# Patient Record
Sex: FEMALE | Race: WHITE | Employment: FULL TIME | URBAN - METROPOLITAN AREA
[De-identification: names, ages, dates, MRNs, and addresses within clinical notes are randomized per-mention and may not be internally consistent; named-entity substitution may affect disease eponyms.]

---

## 2022-11-27 ENCOUNTER — APPOINTMENT (OUTPATIENT)
Dept: GENERAL RADIOLOGY | Age: 42
End: 2022-11-27

## 2022-11-27 ENCOUNTER — HOSPITAL ENCOUNTER (EMERGENCY)
Age: 42
Discharge: HOME | End: 2022-11-27
Attending: EMERGENCY MEDICINE

## 2022-11-27 VITALS
TEMPERATURE: 98.4 F | SYSTOLIC BLOOD PRESSURE: 110 MMHG | BODY MASS INDEX: 24.27 KG/M2 | DIASTOLIC BLOOD PRESSURE: 80 MMHG | WEIGHT: 137 LBS | OXYGEN SATURATION: 95 % | HEIGHT: 63 IN | HEART RATE: 82 BPM | RESPIRATION RATE: 16 BRPM

## 2022-11-27 DIAGNOSIS — J20.9 ACUTE BRONCHITIS, UNSPECIFIED ORGANISM: ICD-10-CM

## 2022-11-27 DIAGNOSIS — R68.89 FLU-LIKE SYMPTOMS: Primary | ICD-10-CM

## 2022-11-27 LAB
FLUAV AG NPH QL IA: NEGATIVE
FLUBV AG NPH QL IA: NEGATIVE
SARS-COV-2 RDRP RESP QL NAA+PROBE: NOT DETECTED
SOURCE: NORMAL
SPECIMEN SOURCE: NORMAL

## 2022-11-27 PROCEDURE — 6360000002 HC RX W HCPCS: Performed by: EMERGENCY MEDICINE

## 2022-11-27 PROCEDURE — 96372 THER/PROPH/DIAG INJ SC/IM: CPT | Performed by: EMERGENCY MEDICINE

## 2022-11-27 PROCEDURE — 87635 SARS-COV-2 COVID-19 AMP PRB: CPT

## 2022-11-27 PROCEDURE — 94640 AIRWAY INHALATION TREATMENT: CPT | Performed by: EMERGENCY MEDICINE

## 2022-11-27 PROCEDURE — 87804 INFLUENZA ASSAY W/OPTIC: CPT

## 2022-11-27 PROCEDURE — 71046 X-RAY EXAM CHEST 2 VIEWS: CPT

## 2022-11-27 PROCEDURE — 99284 EMERGENCY DEPT VISIT MOD MDM: CPT | Performed by: EMERGENCY MEDICINE

## 2022-11-27 PROCEDURE — 6370000000 HC RX 637 (ALT 250 FOR IP): Performed by: EMERGENCY MEDICINE

## 2022-11-27 RX ORDER — DOXYCYCLINE HYCLATE 100 MG
100 TABLET ORAL 2 TIMES DAILY
Qty: 14 TABLET | Refills: 0 | Status: SHIPPED | OUTPATIENT
Start: 2022-11-27 | End: 2022-12-04

## 2022-11-27 RX ORDER — KETOROLAC TROMETHAMINE 30 MG/ML
60 INJECTION, SOLUTION INTRAMUSCULAR; INTRAVENOUS
Status: COMPLETED | OUTPATIENT
Start: 2022-11-27 | End: 2022-11-27

## 2022-11-27 RX ORDER — BENZONATATE 200 MG/1
200 CAPSULE ORAL 3 TIMES DAILY PRN
Qty: 21 CAPSULE | Refills: 0 | Status: SHIPPED | OUTPATIENT
Start: 2022-11-27

## 2022-11-27 RX ORDER — BENZONATATE 100 MG/1
200 CAPSULE ORAL
Status: COMPLETED | OUTPATIENT
Start: 2022-11-27 | End: 2022-11-27

## 2022-11-27 RX ORDER — IBUPROFEN 800 MG/1
800 TABLET ORAL EVERY 8 HOURS PRN
Qty: 21 TABLET | Refills: 0 | Status: SHIPPED | OUTPATIENT
Start: 2022-11-27

## 2022-11-27 RX ORDER — IPRATROPIUM BROMIDE AND ALBUTEROL SULFATE 2.5; .5 MG/3ML; MG/3ML
1 SOLUTION RESPIRATORY (INHALATION)
Status: COMPLETED | OUTPATIENT
Start: 2022-11-27 | End: 2022-11-27

## 2022-11-27 RX ORDER — PREDNISONE 20 MG/1
40 TABLET ORAL DAILY
Qty: 8 TABLET | Refills: 0 | Status: SHIPPED | OUTPATIENT
Start: 2022-11-27 | End: 2022-12-01

## 2022-11-27 RX ORDER — ALBUTEROL SULFATE 90 UG/1
2 AEROSOL, METERED RESPIRATORY (INHALATION) EVERY 6 HOURS PRN
Qty: 18 G | Refills: 3 | Status: SHIPPED | OUTPATIENT
Start: 2022-11-27

## 2022-11-27 RX ORDER — DOXYCYCLINE HYCLATE 100 MG/1
100 CAPSULE ORAL
Status: COMPLETED | OUTPATIENT
Start: 2022-11-27 | End: 2022-11-27

## 2022-11-27 RX ADMIN — KETOROLAC TROMETHAMINE 60 MG: 30 INJECTION, SOLUTION INTRAMUSCULAR at 03:09

## 2022-11-27 RX ADMIN — BENZONATATE 200 MG: 100 CAPSULE ORAL at 03:09

## 2022-11-27 RX ADMIN — DOXYCYCLINE HYCLATE 100 MG: 100 CAPSULE ORAL at 03:09

## 2022-11-27 RX ADMIN — IPRATROPIUM BROMIDE AND ALBUTEROL SULFATE 1 AMPULE: .5; 3 SOLUTION RESPIRATORY (INHALATION) at 03:11

## 2022-11-27 ASSESSMENT — PAIN SCALES - GENERAL
PAINLEVEL_OUTOF10: 8
PAINLEVEL_OUTOF10: 6
PAINLEVEL_OUTOF10: 8

## 2022-11-27 ASSESSMENT — ENCOUNTER SYMPTOMS
RHINORRHEA: 0
COUGH: 1
EYE DISCHARGE: 0
FACIAL SWELLING: 0
BACK PAIN: 1
NAUSEA: 1
ABDOMINAL PAIN: 0
EYE REDNESS: 0
SHORTNESS OF BREATH: 0
COLOR CHANGE: 0
VOMITING: 0

## 2022-11-27 ASSESSMENT — LIFESTYLE VARIABLES: HOW OFTEN DO YOU HAVE A DRINK CONTAINING ALCOHOL: MONTHLY OR LESS

## 2022-11-27 ASSESSMENT — PAIN - FUNCTIONAL ASSESSMENT: PAIN_FUNCTIONAL_ASSESSMENT: 0-10

## 2022-11-27 ASSESSMENT — PAIN DESCRIPTION - DESCRIPTORS: DESCRIPTORS: ACHING

## 2022-11-27 ASSESSMENT — PAIN DESCRIPTION - LOCATION
LOCATION: GENERALIZED
LOCATION: GENERALIZED

## 2022-11-27 NOTE — ED NOTES
Pt c/o generalized body aches, cough and fever for several days.   Audible expiratory wheezes auscultated     Delia Amezquita RN  11/27/22 3327

## 2022-11-27 NOTE — DISCHARGE INSTRUCTIONS
Alternate 4 ibuprofen every 8 hours with 2 extra-strength tylenol every 6 hours  Use inhlaer 2 puffs every 6 hours  1,200mg mucinex DM every 12 hours for a week.   Try a sustained release sudafed every morning,  Drink plenty of fluids    Stay off the cigarretes

## 2022-11-27 NOTE — ED NOTES
I have reviewed discharge instructions with the patient. The patient verbalized understanding. Patient left ED via Discharge Method: ambulatory to Home with ( family self). Opportunity for questions and clarification provided. Patient given 4 scripts. To continue your aftercare when you leave the hospital, you may receive an automated call from our care team to check in on how you are doing. This is a free service and part of our promise to provide the best care and service to meet your aftercare needs.  If you have questions, or wish to unsubscribe from this service please call [phone]. Thank you for Choosing our [facility] Emergency Department.        Evelyn Tinajero RN  11/27/22 4903

## 2022-11-27 NOTE — ED PROVIDER NOTES
[employer] Emergency Department Provider Note                   PCP:                None Provider               Age: 40 y.o. Sex: female       ICD-10-CM    1. Flu-like symptoms  R68.89       2. Acute bronchitis, unspecified organism  J20.9           DISPOSITION         New Prescriptions    No medications on file       Orders Placed This Encounter   Procedures    COVID-19, Rapid    Rapid influenza A/B antigens    XR CHEST (2 VW)    [facility] nursing order (specify)         Anastasia Johnson is a 40 y.o. female who presents to the Emergency Department with chief complaint of    Chief Complaint   Patient presents with    Cough    Fever      Chief complaint : Flulike illness with cough and fever    HISTORY OF PRESENT ILLNESS :  Location : Generalized    Quality : Cough is somewhat productive    Quantity : Constant    Timing : Few days    Severity : Getting worse    Context : Coughing so bad she cannot even smoke cigarettes now    Alleviating / exacerbating factors : No remedies attempted    Associated Symptoms : Chest hurts when coughing    -------------------------------    SOCIAL HISTORY : , smoker, consumes alcohol        Review of Systems   Constitutional:  Positive for activity change, fatigue and fever. Negative for chills. HENT:  Positive for congestion. Negative for facial swelling and rhinorrhea. Eyes:  Negative for discharge and redness. Respiratory:  Positive for cough. Negative for shortness of breath. Cardiovascular:  Positive for chest pain. Negative for palpitations. Gastrointestinal:  Positive for nausea. Negative for abdominal pain and vomiting. Endocrine: Negative for polydipsia and polyuria. Genitourinary:  Negative for difficulty urinating and dysuria. Musculoskeletal:  Positive for arthralgias, back pain and myalgias. Skin:  Negative for color change and pallor. Neurological:  Negative for dizziness and headaches. All other systems reviewed and are negative.    All other place, and time. Psychiatric:         Mood and Affect: Mood normal.         Behavior: Behavior normal.         Thought Content: Thought content normal.        MDM  Number of Diagnoses or Management Options  Acute bronchitis, unspecified organism: new, no workup  Flu-like symptoms: new, needed workup  Diagnosis management comments: Flulike syndrome with acute bronchitis and significant wheezing, will start prednisone, antibiotics, Tessalon, albuterol here and to go       Amount and/or Complexity of Data Reviewed  Clinical lab tests: reviewed and ordered  Tests in the radiology section of CPT®: reviewed and ordered  Decide to obtain previous medical records or to obtain history from someone other than the patient: yes    Risk of Complications, Morbidity, and/or Mortality  Presenting problems: moderate  Diagnostic procedures: low  Management options: low    Patient Progress  Patient progress: improved      Procedures    Labs Reviewed   COVID-19, RAPID   RAPID INFLUENZA A/B ANTIGENS        XR CHEST (2 VW)   Final Result      1. Negative chest x-ray. [place] Coma Scale  Eye Opening: Spontaneous  Best Verbal Response: Oriented  Best Motor Response: Obeys commands  Tyree Coma Scale Score: 15                     Voice dictation software was used during the making of this note. This software is not perfect and grammatical and other typographical errors may be present. This note has not been completely proofread for errors.        Wesley Zaidi MD  11/27/22 6312

## 2022-12-05 ENCOUNTER — APPOINTMENT (OUTPATIENT)
Dept: GENERAL RADIOLOGY | Age: 42
End: 2022-12-05
Payer: COMMERCIAL

## 2022-12-05 ENCOUNTER — HOSPITAL ENCOUNTER (EMERGENCY)
Age: 42
Discharge: HOME | End: 2022-12-05
Attending: EMERGENCY MEDICINE | Admitting: EMERGENCY MEDICINE
Payer: COMMERCIAL

## 2022-12-05 VITALS
RESPIRATION RATE: 18 BRPM | BODY MASS INDEX: 24.27 KG/M2 | HEIGHT: 63 IN | HEART RATE: 88 BPM | WEIGHT: 137 LBS | SYSTOLIC BLOOD PRESSURE: 145 MMHG | OXYGEN SATURATION: 100 % | DIASTOLIC BLOOD PRESSURE: 92 MMHG | TEMPERATURE: 98.9 F
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
## 2022-12-05 PROCEDURE — 90471 IMMUNIZATION ADMIN: CPT | Performed by: EMERGENCY MEDICINE

## 2022-12-05 PROCEDURE — 6360000002 HC RX W HCPCS: Performed by: EMERGENCY MEDICINE

## 2022-12-05 PROCEDURE — 73120 X-RAY EXAM OF HAND: CPT

## 2022-12-05 PROCEDURE — 99284 EMERGENCY DEPT VISIT MOD MDM: CPT | Performed by: EMERGENCY MEDICINE

## 2022-12-05 PROCEDURE — 73130 X-RAY EXAM OF HAND: CPT

## 2022-12-05 PROCEDURE — 6370000000 HC RX 637 (ALT 250 FOR IP): Performed by: EMERGENCY MEDICINE

## 2022-12-05 PROCEDURE — 90715 TDAP VACCINE 7 YRS/> IM: CPT | Performed by: EMERGENCY MEDICINE

## 2022-12-05 RX ORDER — HYDROCODONE BITARTRATE AND ACETAMINOPHEN 5; 325 MG/1; MG/1
1 TABLET ORAL EVERY 6 HOURS PRN
Qty: 12 TABLET | Refills: 0 | Status: SHIPPED | OUTPATIENT
Start: 2022-12-05 | End: 2022-12-08

## 2022-12-05 RX ORDER — AMOXICILLIN AND CLAVULANATE POTASSIUM 875; 125 MG/1; MG/1
1 TABLET, FILM COATED ORAL 2 TIMES DAILY
Qty: 20 TABLET | Refills: 0 | Status: SHIPPED | OUTPATIENT
Start: 2022-12-05 | End: 2022-12-15

## 2022-12-05 RX ORDER — IBUPROFEN 600 MG/1
600 TABLET ORAL
Status: COMPLETED | OUTPATIENT
Start: 2022-12-05 | End: 2022-12-05

## 2022-12-05 RX ORDER — AMOXICILLIN AND CLAVULANATE POTASSIUM 875; 125 MG/1; MG/1
1 TABLET, FILM COATED ORAL
Status: COMPLETED | OUTPATIENT
Start: 2022-12-05 | End: 2022-12-05

## 2022-12-05 RX ADMIN — IBUPROFEN 600 MG: 600 TABLET, FILM COATED ORAL at 05:15

## 2022-12-05 RX ADMIN — AMOXICILLIN AND CLAVULANATE POTASSIUM 1 TABLET: 875; 125 TABLET, FILM COATED ORAL at 05:15

## 2022-12-05 RX ADMIN — TETANUS TOXOID, REDUCED DIPHTHERIA TOXOID AND ACELLULAR PERTUSSIS VACCINE, ADSORBED 0.5 ML: 5; 2.5; 8; 8; 2.5 SUSPENSION INTRAMUSCULAR at 05:17

## 2022-12-05 ASSESSMENT — PAIN DESCRIPTION - LOCATION: LOCATION: HAND

## 2022-12-05 ASSESSMENT — PAIN DESCRIPTION - ORIENTATION: ORIENTATION: LEFT

## 2022-12-05 ASSESSMENT — ENCOUNTER SYMPTOMS
GASTROINTESTINAL NEGATIVE: 1
EYES NEGATIVE: 1
RESPIRATORY NEGATIVE: 1

## 2022-12-05 ASSESSMENT — PAIN SCALES - GENERAL: PAINLEVEL_OUTOF10: 9

## 2022-12-05 ASSESSMENT — PAIN - FUNCTIONAL ASSESSMENT: PAIN_FUNCTIONAL_ASSESSMENT: 0-10

## 2022-12-05 NOTE — Clinical Note
Jackie Waddell was seen and treated in our emergency department on 12/5/2022. She may return to work on 12/07/2022. If you have any questions or concerns, please don't hesitate to call.       Alejandra Davis MD

## 2022-12-05 NOTE — ED PROVIDER NOTES
Emergency Department Provider Note                   PCP:                None Provider               Age: 40 y.o. Sex: female       ICD-10-CM    1. Dog bite, initial encounter  W54. 0XXA HYDROcodone-acetaminophen (Fleeta Mast) 5-325 MG per tablet          DISPOSITION Decision To Discharge 12/05/2022 05:09:39 AM        MDM  Number of Diagnoses or Management Options  Dog bite, initial encounter  Diagnosis management comments: Patient was with her mother when they were both bitten by their own dog during a dog attack. Patient received Tdap along with Augmentin. She was copiously irrigated for 10 minutes. Did not receive sutures due to the nature of it being a dog bite. Plain films were obtained of both hands did not show any acute fracture dislocation or foreign body. She will receive prescription for Augmentin and pain medication. Discussed extensively reasons to return regarding infection. Patient's dog was up to date on shots and did not require rabies. Orders Placed This Encounter   Procedures    XR HAND LEFT (MIN 3 VIEWS)    XR HAND RIGHT (2 VIEWS)        Medications   Tetanus-Diphth-Acell Pertussis (BOOSTRIX) injection 0.5 mL (has no administration in time range)   amoxicillin-clavulanate (AUGMENTIN) 875-125 MG per tablet 1 tablet (has no administration in time range)   ibuprofen (ADVIL;MOTRIN) tablet 600 mg (has no administration in time range)       New Prescriptions    AMOXICILLIN-CLAVULANATE (AUGMENTIN) 875-125 MG PER TABLET    Take 1 tablet by mouth 2 times daily for 10 days    HYDROCODONE-ACETAMINOPHEN (NORCO) 5-325 MG PER TABLET    Take 1 tablet by mouth every 6 hours as needed for Pain for up to 3 days. Intended supply: 3 days.  Take lowest dose possible to manage pain        Beau Phan is a 40 y.o. female who presents to the Emergency Department with chief complaint of    Chief Complaint   Patient presents with    Animal Bite      20-year-old female presenting with dog bite to both hands.  Her dog was attacked by another dog and in the confusion she was bitten by her own dog. Her dog is up-to-date. Her mother is here with her who is up also bitten by her own dog. Patient's primary complaint is of both hands and fingers. No other bite wounds or injuries. Review of Systems   HENT: Negative. Eyes: Negative. Respiratory: Negative. Cardiovascular: Negative. Gastrointestinal: Negative. Genitourinary: Negative. Musculoskeletal: Negative. Skin:  Positive for wound. Neurological: Negative. Psychiatric/Behavioral: Negative. Past Medical History:   Diagnosis Date    Patent ductus arteriosus         Past Surgical History:   Procedure Laterality Date    CARDIAC SURGERY      CHOLECYSTECTOMY      JOINT REPLACEMENT          No family history on file. Social History     Socioeconomic History    Marital status:    Tobacco Use    Smoking status: Every Day     Types: Cigarettes   Substance and Sexual Activity    Alcohol use: Yes     Comment: occassionally    Drug use: Not Currently         Aspirin and Hydrocodone     Previous Medications    ALBUTEROL SULFATE HFA (PROVENTIL HFA) 108 (90 BASE) MCG/ACT INHALER    Inhale 2 puffs into the lungs every 6 hours as needed for Wheezing    BENZONATATE (TESSALON) 200 MG CAPSULE    Take 1 capsule by mouth 3 times daily as needed for Cough    IBUPROFEN (IBU) 800 MG TABLET    Take 1 tablet by mouth every 8 hours as needed for Pain        Vitals signs and nursing note reviewed. Patient Vitals for the past 4 hrs:   Temp Pulse Resp BP SpO2   12/05/22 0445 98.8 °F (37.1 °C) 97 20 (!) 155/113 100 %          Physical Exam  Constitutional:       General: She is not in acute distress. Appearance: Normal appearance. She is not ill-appearing. HENT:      Head: Normocephalic and atraumatic. Nose: No rhinorrhea.       Mouth/Throat:      Mouth: Mucous membranes are moist.   Eyes:      Extraocular Movements: Extraocular movements intact. Cardiovascular:      Rate and Rhythm: Normal rate and regular rhythm. Pulmonary:      Effort: Pulmonary effort is normal.      Breath sounds: Normal breath sounds. Abdominal:      General: Abdomen is flat. Bowel sounds are normal. There is no distension. Palpations: Abdomen is soft. Tenderness: There is no abdominal tenderness. Musculoskeletal:         General: Normal range of motion. Cervical back: Normal range of motion. Skin:     General: Skin is warm and dry. Comments: Patient has puncture wounds on the left dorsum of the proximal hand towards the wrist on the first second fourth distal phalanges. All are puncture wounds. There is no open gaping wounds all approximately 1 to 2 mm. No foreign bodies appreciated bleeding is controlled. Neurovascularly intact. On her right hand she has a small puncture wound on her mid thumb with no bleeding foreign body. She is neurovascularly intact. Neurological:      General: No focal deficit present. Mental Status: She is alert. Psychiatric:         Mood and Affect: Mood normal.        Procedures    Results for orders placed or performed during the hospital encounter of 12/05/22   XR HAND LEFT (MIN 3 VIEWS)    Narrative    EXAMINATION: Right hand    HISTORY: Dog bite to dorsal palmar aspect hand. TECHNIQUE: Frontal, lateral, and oblique views of the right hand. COMPARISON: None available. FINDINGS:   There is no evidence of acute fracture or dislocation. Joint spaces are maintained. Soft tissues are within normal limits. No subcutaneous gas appreciated. No radiopaque foreign body. Impression    No evidence of acute fracture or dislocation within the right hand. EXAMINATION: Left hand    HISTORY: Dog bite to dorsal palmar aspect hand. TECHNIQUE: Frontal, lateral, and oblique views of the left hand. COMPARISON: None available.     FINDINGS:   There is no evidence of acute fracture or dislocation. Joint spaces are maintained. Soft tissues are within normal limits. No subcutaneous gas observed. No radiopaque foreign body. IMPRESSION:  No evidence of acute fracture or dislocation within the left hand. XR HAND RIGHT (2 VIEWS)    Narrative    EXAMINATION: Right hand    HISTORY: Dog bite to dorsal palmar aspect hand. TECHNIQUE: Frontal, lateral, and oblique views of the right hand. COMPARISON: None available. FINDINGS:   There is no evidence of acute fracture or dislocation. Joint spaces are maintained. Soft tissues are within normal limits. No subcutaneous gas appreciated. No radiopaque foreign body. Impression    No evidence of acute fracture or dislocation within the right hand. EXAMINATION: Left hand    HISTORY: Dog bite to dorsal palmar aspect hand. TECHNIQUE: Frontal, lateral, and oblique views of the left hand. COMPARISON: None available. FINDINGS:   There is no evidence of acute fracture or dislocation. Joint spaces are maintained. Soft tissues are within normal limits. No subcutaneous gas observed. No radiopaque foreign body. IMPRESSION:  No evidence of acute fracture or dislocation within the left hand. XR HAND LEFT (MIN 3 VIEWS)   Final Result   No evidence of acute fracture or dislocation within the right hand. EXAMINATION: Left hand      HISTORY: Dog bite to dorsal palmar aspect hand. TECHNIQUE: Frontal, lateral, and oblique views of the left hand. COMPARISON: None available. FINDINGS:    There is no evidence of acute fracture or dislocation. Joint spaces are maintained. Soft tissues are within normal limits. No subcutaneous gas observed. No radiopaque foreign body. IMPRESSION:   No evidence of acute fracture or dislocation within the left hand.             XR HAND RIGHT (2 VIEWS)   Final Result   No evidence of acute fracture or dislocation within the right hand. EXAMINATION: Left hand      HISTORY: Dog bite to dorsal palmar aspect hand. TECHNIQUE: Frontal, lateral, and oblique views of the left hand. COMPARISON: None available. FINDINGS:    There is no evidence of acute fracture or dislocation. Joint spaces are maintained. Soft tissues are within normal limits. No subcutaneous gas observed. No radiopaque foreign body. IMPRESSION:   No evidence of acute fracture or dislocation within the left hand. Voice dictation software was used during the making of this note. This software is not perfect and grammatical and other typographical errors may be present. This note has not been completely proofread for errors.      Tracy Fernandez MD  12/05/22 3356

## 2022-12-05 NOTE — ED NOTES
I have reviewed discharge instructions with the patient. The patient verbalized understanding. Patient left ED via Discharge Method: ambulatory to Home with mom    Opportunity for questions and clarification provided. Patient given 2 scripts. To continue your aftercare when you leave the hospital, you may receive an automated call from our care team to check in on how you are doing. This is a free service and part of our promise to provide the best care and service to meet your aftercare needs.  If you have questions, or wish to unsubscribe from this service please call [phone]. Thank you for Choosing our [facility] Emergency Department.         Sadi Zhou RN  12/05/22 5837

## 2022-12-05 NOTE — ED TRIAGE NOTES
PT ambulatory to ED. Pt reports her mother was taking their dog out when the neighbors dog charged at their dog. Pt went to separate the dogs and got bit by their own dog. Pt reports their dog is up to date on shots. Bleeding is controlled.

## 2024-02-01 ENCOUNTER — HOSPITAL ENCOUNTER (EMERGENCY)
Age: 44
Discharge: HOME | End: 2024-02-01
Attending: EMERGENCY MEDICINE
Payer: COMMERCIAL

## 2024-02-01 ENCOUNTER — APPOINTMENT (OUTPATIENT)
Dept: GENERAL RADIOLOGY | Age: 44
End: 2024-02-01
Payer: COMMERCIAL

## 2024-02-01 VITALS
BODY MASS INDEX: 26.05 KG/M2 | OXYGEN SATURATION: 100 % | TEMPERATURE: 98.2 F | WEIGHT: 147 LBS | HEIGHT: 63 IN | RESPIRATION RATE: 17 BRPM | DIASTOLIC BLOOD PRESSURE: 81 MMHG | SYSTOLIC BLOOD PRESSURE: 117 MMHG | HEART RATE: 70 BPM

## 2024-02-01 DIAGNOSIS — S46.911A STRAIN OF RIGHT ELBOW, INITIAL ENCOUNTER: Primary | ICD-10-CM

## 2024-02-01 PROCEDURE — 73080 X-RAY EXAM OF ELBOW: CPT

## 2024-02-01 PROCEDURE — 99283 EMERGENCY DEPT VISIT LOW MDM: CPT

## 2024-02-01 RX ORDER — NAPROXEN SODIUM 550 MG/1
550 TABLET ORAL 2 TIMES DAILY WITH MEALS
Qty: 30 TABLET | Refills: 0 | Status: SHIPPED | OUTPATIENT
Start: 2024-02-01

## 2024-02-01 ASSESSMENT — ENCOUNTER SYMPTOMS
ABDOMINAL PAIN: 0
BOWEL INCONTINENCE: 0

## 2024-02-01 ASSESSMENT — PAIN - FUNCTIONAL ASSESSMENT: PAIN_FUNCTIONAL_ASSESSMENT: 0-10

## 2024-02-01 NOTE — DISCHARGE INSTRUCTIONS
Take medications as prescribed  Keep arm in sling  Follow-up orthopedics if symptoms persist  Return to the ER for any new, worse or life-threatening symptoms

## 2024-02-01 NOTE — ED PROVIDER NOTES
Emergency Department Provider Note       PCP: Domenica Gaspar APRN - NP   Age: 43 y.o.   Sex: female     DISPOSITION Decision To Discharge 02/01/2024 11:28:49 AM       ICD-10-CM    1. Strain of right elbow, initial encounter  S46.911A           Medical Decision Making     Complexity of Problems Addressed:  Complexity of Problem: 1 acute, uncomplicated illness or injury.    Data Reviewed and Analyzed:  I independently ordered and reviewed each unique test.  I reviewed external records: ED visit note from an outside group.  I reviewed external records: provider visit note from PCP.       I interpreted the X-rays.  No acute fracture noted    Discussion of management or test interpretation.  Some decreased motion and tenderness at the elbow.  No wrist pain.  No shoulder pain.  Will obtain right elbow x-ray.    11:31 AM EST  X-ray shows no acute fracture.  Will place in sling.  Start NSAIDs.  Encourage follow-up with orthopedics if symptoms worsen       Risk of Complications and/or Morbidity of Patient Management:  OTC drug management performed        History      The history is provided by the patient.   Fall  The accident occurred 2 days ago. The fall occurred while walking. The point of impact was the right elbow. The pain is present in the right elbow. The pain is at a severity of 2/10. The pain is moderate. She was Ambulatory at the scene. Pertinent negatives include no abdominal pain, no bowel incontinence, no hematuria and no headaches.   Arm Pain  This is a new problem. The current episode started 2 days ago. The problem occurs constantly. Pertinent negatives include no abdominal pain and no headaches.        Physical Exam     Vitals signs and nursing note reviewed.   Vitals:    02/01/24 0958   BP: 114/73   Pulse: 74   Resp: 18   Temp: 98.2 °F (36.8 °C)   TempSrc: Oral   SpO2: 97%   Weight: 66.7 kg (147 lb)   Height: 1.6 m (5' 3\")       Physical Exam  Vitals and nursing note reviewed.   Constitutional:

## 2024-02-01 NOTE — ED TRIAGE NOTES
Pt reports that she fell down her steps several nights ago and her right arm made a popping noise and now is having bad pain and limited movement to right arm/hand.  Denies hitting head.